# Patient Record
Sex: MALE | Race: WHITE | NOT HISPANIC OR LATINO | Employment: UNEMPLOYED | URBAN - METROPOLITAN AREA
[De-identification: names, ages, dates, MRNs, and addresses within clinical notes are randomized per-mention and may not be internally consistent; named-entity substitution may affect disease eponyms.]

---

## 2021-05-06 ENCOUNTER — AMBULATORY - HEALTHEAST (OUTPATIENT)
Dept: SURGERY | Facility: CLINIC | Age: 36
End: 2021-05-06

## 2021-05-06 ENCOUNTER — RECORDS - HEALTHEAST (OUTPATIENT)
Dept: ADMINISTRATIVE | Facility: OTHER | Age: 36
End: 2021-05-06

## 2021-05-06 DIAGNOSIS — Z11.59 ENCOUNTER FOR SCREENING FOR OTHER VIRAL DISEASES: ICD-10-CM

## 2021-05-13 ENCOUNTER — ANESTHESIA - HEALTHEAST (OUTPATIENT)
Dept: SURGERY | Facility: CLINIC | Age: 36
End: 2021-05-13

## 2021-05-18 ENCOUNTER — AMBULATORY - HEALTHEAST (OUTPATIENT)
Dept: LAB | Facility: CLINIC | Age: 36
End: 2021-05-18

## 2021-05-18 DIAGNOSIS — Z11.59 ENCOUNTER FOR SCREENING FOR OTHER VIRAL DISEASES: ICD-10-CM

## 2021-05-19 ENCOUNTER — COMMUNICATION - HEALTHEAST (OUTPATIENT)
Dept: LAB | Facility: CLINIC | Age: 36
End: 2021-05-19

## 2021-05-19 LAB
SARS-COV-2 PCR COMMENT: ABNORMAL
SARS-COV-2 RNA SPEC QL NAA+PROBE: POSITIVE
SARS-COV-2 VIRUS SPECIMEN SOURCE: ABNORMAL

## 2021-05-21 ENCOUNTER — SURGERY - HEALTHEAST (OUTPATIENT)
Dept: SURGERY | Facility: CLINIC | Age: 36
End: 2021-05-21

## 2021-05-30 ENCOUNTER — ANESTHESIA - HEALTHEAST (OUTPATIENT)
Dept: SURGERY | Facility: CLINIC | Age: 36
End: 2021-05-30

## 2021-06-04 ENCOUNTER — RECORDS - HEALTHEAST (OUTPATIENT)
Dept: ADMINISTRATIVE | Facility: OTHER | Age: 36
End: 2021-06-04

## 2021-06-04 ENCOUNTER — SURGERY - HEALTHEAST (OUTPATIENT)
Dept: SURGERY | Facility: CLINIC | Age: 36
End: 2021-06-04

## 2021-06-04 ASSESSMENT — MIFFLIN-ST. JEOR: SCORE: 2441.34

## 2021-06-15 ENCOUNTER — COMMUNICATION - HEALTHEAST (OUTPATIENT)
Dept: SURGERY | Facility: CLINIC | Age: 36
End: 2021-06-15

## 2021-06-16 PROBLEM — M54.12 CERVICAL RADICULOPATHY: Status: ACTIVE | Noted: 2021-06-05

## 2021-06-17 NOTE — TELEPHONE ENCOUNTER
"-Coronavirus (COVID-19) Notification    Caller Name (Patient, parent, daughter/son, grandparent, etc)  Mikey Palmer    Reason for call  Notify of Positive Coronavirus (COVID-19) lab results, assess symptoms,  review  [employer] recommendations    Lab Result    Lab test:  2019-nCoV rRt-PCR or SARS-CoV-2 PCR    Oropharyngeal AND/OR nasopharyngeal swabs is POSITIVE for 2019-nCoV RNA/SARS-COV-2 PCR (COVID-19 virus)    RN Recommendations/Instructions per [facility] Coronavirus COVID-19 recommendations    Brief introduction script  Introduce self and then review script:  \"I am calling on behalf of [employer].  We were notified that your Coronavirus test (COVID-19) for was POSITIVE for the virus.  I have some information to relay to you but first I wanted to mention that the [place] Dept of Health will be contacting you shortly [it's possible MD already called Patient] to talk to you more about how you are feeling and other people you have had contact with who might now also have the virus.  Also,  [employer] [place] is Partnering with the [facility] for Covid-19 research, you may be contacted directly by research staff.\"    Assessment (Inquire about Patient's current symptoms)   Assessment   Current Symptoms at time of phone call: (if no symptoms, document No symptoms] None   Symptom onset (if applicable) NA     If at time of call, Patients symptoms hare worsened, the Patient should contact 911 or have someone drive them to Emergency Dept promptly:      If Patient calling 911, inform 911 personal that you have tested positive for the Coronavirus (COVID-19).  Place mask on and await 911 to arrive.    If Emergency Dept, If possible, please have another adult drive you to the Emergency Dept but you need to wear mask when in contact with other people.      Monoclonal Antibody Administration    You may be eligible to receive a new treatment with a monoclonal antibody for preventing hospitalization in " "patients at high risk for complications from COVID-19.   This medication is still experimental and available on a limited basis; it is given through an IV and must be given at an infusion center. Please note that not all people who are eligible will receive the medication since it is in limited supply.     Are you interested in being considered for this medication?  No.  Does the patient fit the criteria: No    If patient qualifies based on above criteria:  \"You will be contacted if you are selected to receive this treatment in the next 1-2 business days.   This is time sensitive and if you are not selected in the next 1-2 business days, you will not receive the medication.  If you do not receive a call to schedule, you have not been selected.\"    Review information with Patient    Your result was positive. This means you have COVID-19 (coronavirus).  We have sent you a letter that reviews the information that I'll be reviewing with you now.    How can I protect others?    If you have symptoms: stay home and away from others (self-isolate) until:    You've had no fever--and no medicine that reduces fever--for 1 full day (24 hours). And      Your other symptoms have gotten better. For example, your cough or breathing has improved. And     At least 10 days have passed since your symptoms started. (If you ve been told by a doctor that you have a weak immune system, wait 20 days.)     If you don't have symptoms: Stay home and away from others (self-isolate) until at least 10 days have passed since your first positive COVID-19 test. (Date test collected).    During this time:    Stay in your own room, including for meals. Use your own bathroom if you can.    Stay away from others in your home. No hugging, kissing or shaking hands. No visitors.     Don't go to work, school or anywhere else.     Clean  high touch  surfaces often (doorknobs, counters, handles, etc.). Use a household cleaning spray or wipes. You'll find a " full list on the EPA website at www.epa.gov/pesticide-registration/list-n-disinfectants-use-against-sars-cov-2.     Cover your mouth and nose with a mask, tissue or other face covering to avoid spreading germs.    Wash your hands and face often with soap and water.    Make a list of people you have been in close contact with recently, even if either of you wore a face covering.   ; Start your list from 2 days before you became ill or had a positive test.  ; Include anyone that was within 6 feet of you for a cumulative total of 15 minutes or more in 24 hours. (Example: if you sat next to Collin for 5 minutes in the morning and 10 minutes in the afternoon, then you were in close contact for 15 minutes total that day. Collin would be added to your list.)    A public health worker will call or text you. It is important that you answer. They will ask you questions about possible exposures to COVID-19, such as people you have been in direct contact with and places you have visited.    Tell the people on your list that you have COVID-19; they should stay away from others for 14 days starting from the last time they were in contact with you (unless you are told something different from a public health worker).     Caregivers in these groups are at risk for severe illness due to COVID-19:  o People 65 years and older  o People who live in a nursing home or long-term care facility  o People with chronic disease (lung, heart, cancer, diabetes, kidney, liver, immunologic)  o People who have a weakened immune system, including those who:  - Are in cancer treatment  - Take medicine that weakens the immune system, such as corticosteroids  - Had a bone marrow or organ transplant  - Have an immune deficiency  - Have poorly controlled HIV or AIDS  - Are obese (body mass index of 40 or higher)  - Smoke regularly    Caregivers should wear gloves while washing dishes, handling laundry and cleaning bedrooms and bathrooms.    Wash and dry  laundry with special caution. Don't shake dirty laundry, and use the warmest water setting you can.    If you have a weakened immune system, ask your doctor about other actions you should take.    For more tips, go to www.cdc.gov/coronavirus/2019-ncov/downloads/10Things.pdf.    You should not go back to work until you meet the guidelines above for ending your home isolation. You don't need to be retested for COVID-19 before going back to work--studies show that you won't spread the virus if it's been at least 10 days since your symptoms started (or 20 days, if you have a weak immune system).    Employers: This document serves as formal notice of your employee's medical guidelines for going back to work. They must meet the above guidelines before going back to work in person.    How can I take care of myself?    1. Get lots of rest. Drink extra fluids (unless a doctor has told you not to).    2. Take Tylenol (acetaminophen) for fever or pain. If you have liver or kidney problems, ask your family doctor if it's okay to take Tylenol.     Take either:     650 mg (two 325 mg pills) every 4 to 6 hours, or     1,000 mg (two 500 mg pills) every 8 hours as needed.     Note: Don't take more than 3,000 mg in one day. Acetaminophen is found in many medicines (both prescribed and over-the-counter medicines). Read all labels to be sure you don't take too much.    For children, check the Tylenol bottle for the right dose (based on their age or weight).    3. If you have other health problems (like cancer, heart failure, an organ transplant or severe kidney disease): Call your specialty clinic if you don't feel better in the next 2 days.    4. Know when to call 911: Emergency warning signs include:    Trouble breathing or shortness of breath    Pain or pressure in the chest that doesn't go away    Feeling confused like you haven't felt before, or not being able to wake up    Bluish-colored lips or face    5. Sign up for [facility]  Loop. We know it's scary to hear that you have COVID-19. We want to track your symptoms to make sure you're okay over the next 2 weeks. Please look for an email from Jeromy Sheehan--this is a free, online program that we'll use to keep in touch. To sign up, follow the link in the email. Learn more at [email or web address].    Where can I get more information?    [facility]: [email or web address]    Coronavirus Basics: [email or web address]/[email or web address]/basics.html    What to Do If You're Sick: www.cdc.gov/coronavirus/2019-ncov/about/steps-when-sick.html    Ending Home Isolation: www.cdc.gov/coronavirus/2019-ncov/hcp/disposition-in-home-patients.html     Caring for Someone with COVID-19: www.cdc.gov/coronavirus/2019-ncov/if-you-are-sick/care-for-someone.html     [facility] clinical trials (COVID-19 research studies): [email or web address]     A Positive COVID-19 letter will be sent via [employer] or the Mail.  (Exception, no letters sent to Presurgerical/Preprocedure Patients)  Pre-procedure test result.  Patient advised to contact the providers office for instruction o rescheduling procedure.  Chitra/Mariah Olvera LPN

## 2021-06-25 NOTE — TELEPHONE ENCOUNTER
Patient called in to request lab letter for positive covid results for unemployment.  Patient was transferred to medical records to assist.

## 2021-06-26 NOTE — ANESTHESIA PREPROCEDURE EVALUATION
Anesthesia Evaluation      Patient summary reviewed   No history of anesthetic complications     Airway   Mallampati: II  Neck ROM: full   Pulmonary - negative ROS and normal exam    breath sounds clear to auscultation                         Cardiovascular - negative ROS and normal exam  Rhythm: regular  Rate: normal,         Neuro/Psych - negative ROS     Endo/Other - negative ROS   (+) obesity,      GI/Hepatic/Renal - negative ROS           Dental - normal exam                        Anesthesia Plan  Planned anesthetic: general endotracheal  Routine induction with propofol, narcotic, relaxant.  Routine anti-nausea prophylaxis.  ASA 2   Induction: intravenous   Anesthetic plan and risks discussed with: patient    Post-op plan: routine recovery

## 2021-06-26 NOTE — ANESTHESIA CARE TRANSFER NOTE
Last vitals:   Vitals:    06/04/21 1837   BP: 131/83   Pulse: (!) 101   Resp: 16   Temp: 36.2  C (97.1  F)   SpO2: 91%     Patient's level of consciousness is drowsy  Spontaneous respirations: yes  Maintains airway independently: yes  Dentition unchanged: yes  Oropharynx: oropharynx clear of all foreign objects    QCDR Measures:  ASA# 20 - Surgical Safety Checklist: WHO surgical safety checklist completed prior to induction    PQRS# 430 - Adult PONV Prevention: 4558F - Pt received => 2 anti-emetic agents (different classes) preop & intraop  ASA# 8 - Peds PONV Prevention: NA - Not pediatric patient, not GA or 2 or more risk factors NOT present  PQRS# 424 - Cynthia-op Temp Management: 4559F - At least one body temp DOCUMENTED => 35.5C or 95.9F within required timeframe  PQRS# 426 - PACU Transfer Protocol: - Transfer of care checklist used  ASA# 14 - Acute Post-op Pain: ASA14B - Patient did NOT experience pain >= 7 out of 10

## 2021-06-26 NOTE — ANESTHESIA POSTPROCEDURE EVALUATION
Patient: Mikey Palmer  Procedure(s):  CERVICAL 4-5, CERVICAL 5-6 ANTERIOR CERVICAL DISCECTOMY FUSION  Anesthesia type: general    Patient location: PACU  Last vitals:   Vitals Value Taken Time   /77 06/04/21 1920   Temp 36.2  C (97.1  F) 06/04/21 1837   Pulse 89 06/04/21 1932   Resp 8 06/04/21 1930   SpO2 94 % 06/04/21 1932   Vitals shown include unvalidated device data.  Post vital signs: stable  Level of consciousness: awake and responds to simple questions  Post-anesthesia pain: pain controlled  Post-anesthesia nausea and vomiting: no  Pulmonary: unassisted, return to baseline  Cardiovascular: stable and blood pressure at baseline  Hydration: adequate  Anesthetic events: no    QCDR Measures:  ASA# 11 - Cynthia-op Cardiac Arrest: ASA11B - Patient did NOT experience unanticipated cardiac arrest  ASA# 12 - Cynthia-op Mortality Rate: ASA12B - Patient did NOT die  ASA# 13 - PACU Re-Intubation Rate: ASA13B - Patient did NOT require a new airway mgmt  ASA# 10 - Composite Anes Safety: ASA10A - No serious adverse event    Additional Notes:

## 2021-07-06 VITALS — BODY MASS INDEX: 39.29 KG/M2 | WEIGHT: 314.31 LBS | HEIGHT: 75 IN

## 2021-12-13 ENCOUNTER — OFFICE VISIT (OUTPATIENT)
Dept: FAMILY MEDICINE | Facility: CLINIC | Age: 36
End: 2021-12-13
Payer: COMMERCIAL

## 2021-12-13 VITALS
OXYGEN SATURATION: 98 % | DIASTOLIC BLOOD PRESSURE: 95 MMHG | SYSTOLIC BLOOD PRESSURE: 158 MMHG | HEART RATE: 83 BPM | BODY MASS INDEX: 40 KG/M2 | WEIGHT: 315 LBS | RESPIRATION RATE: 18 BRPM | TEMPERATURE: 99.1 F

## 2021-12-13 DIAGNOSIS — Z76.89 ENCOUNTER FOR NEW MEDICATION PRESCRIPTION: ICD-10-CM

## 2021-12-13 DIAGNOSIS — Z20.2 POTENTIAL EXPOSURE TO STD: Primary | ICD-10-CM

## 2021-12-13 DIAGNOSIS — Z11.3 SCREEN FOR STD (SEXUALLY TRANSMITTED DISEASE): ICD-10-CM

## 2021-12-13 PROBLEM — E66.01 MORBID OBESITY (H): Status: ACTIVE | Noted: 2021-12-13

## 2021-12-13 PROCEDURE — 87491 CHLMYD TRACH DNA AMP PROBE: CPT | Performed by: FAMILY MEDICINE

## 2021-12-13 PROCEDURE — 99204 OFFICE O/P NEW MOD 45 MIN: CPT | Performed by: FAMILY MEDICINE

## 2021-12-13 PROCEDURE — 87340 HEPATITIS B SURFACE AG IA: CPT | Performed by: FAMILY MEDICINE

## 2021-12-13 PROCEDURE — 87389 HIV-1 AG W/HIV-1&-2 AB AG IA: CPT | Performed by: FAMILY MEDICINE

## 2021-12-13 PROCEDURE — 86803 HEPATITIS C AB TEST: CPT | Performed by: FAMILY MEDICINE

## 2021-12-13 PROCEDURE — 86780 TREPONEMA PALLIDUM: CPT | Performed by: FAMILY MEDICINE

## 2021-12-13 PROCEDURE — 36415 COLL VENOUS BLD VENIPUNCTURE: CPT | Performed by: FAMILY MEDICINE

## 2021-12-13 PROCEDURE — 87591 N.GONORRHOEAE DNA AMP PROB: CPT | Performed by: FAMILY MEDICINE

## 2021-12-13 RX ORDER — EMTRICITABINE AND TENOFOVIR DISOPROXIL FUMARATE 200; 300 MG/1; MG/1
1 TABLET, FILM COATED ORAL DAILY
Qty: 28 TABLET | Refills: 0 | Status: SHIPPED | OUTPATIENT
Start: 2021-12-13 | End: 2022-01-10

## 2021-12-13 RX ORDER — CYCLOBENZAPRINE HCL 10 MG
TABLET ORAL
COMMUNITY
Start: 2021-09-27

## 2021-12-14 LAB — HIV 1+2 AB+HIV1 P24 AG SERPL QL IA: NEGATIVE

## 2021-12-14 NOTE — PROGRESS NOTES
"  Patient presents with:  Consult: Requesting medications to reduce the chances of an STD - Pt states he was having sex last night and this morning and his partner \" took the condom off\"       Subjective     Mikey Palmer is a 35 year old male who presents to clinic today for the following health issues:    HPI     STD exposure    Onset/time of potential exposure was yesterday, 12/12/21  in am and today 12/13/21 -unprotected oral and vaginal intercourse  Course of illness-no active symptoms at present, requesting preexposure prophylaxis  Does not know partner's  status -HIV and STDS    Prefers both male and female partners, last male partner 1 year ago, patient completed testing for HIV and was negative     No recent female intercourse until 2 days ago, no urethral discharge, no testicular pain or swelling, no erythema/rash in genital area, no abdominal pain, no frequent urination/nocturia/dysuria, no history of prostatitis   Patient has not taken nPEP in the past  No history of liver disease/hepatitis or excessive etoh use       No past medical history on file.  Social History     Tobacco Use     Smoking status: Never Smoker     Smokeless tobacco: Never Used   Substance Use Topics     Alcohol use: Not Currently       Current Outpatient Medications   Medication Sig Dispense Refill     acetaminophen (TYLENOL) 325 MG tablet [ACETAMINOPHEN (TYLENOL) 325 MG TABLET] Take 3 tablets (975 mg total) by mouth every 8 (eight) hours.  0     emtricitabine-tenofovir (TRUVADA) 200-300 MG per tablet Take 1 tablet by mouth daily for 28 days 28 tablet 0     raltegravir (ISENTRESS) 400 MG tablet Take 1 tablet (400 mg) by mouth 2 times daily for 28 days 56 tablet 0     cyclobenzaprine (FLEXERIL) 10 MG tablet        hydrOXYzine pamoate (VISTARIL) 25 MG capsule [HYDROXYZINE PAMOATE (VISTARIL) 25 MG CAPSULE] Take 1 capsule (25 mg total) by mouth every 4 (four) hours as needed for itching, anxiety or other (muscle spasms). " (Patient not taking: Reported on 12/13/2021) 30 capsule 0     oxyCODONE (ROXICODONE) 5 MG immediate release tablet [OXYCODONE (ROXICODONE) 5 MG IMMEDIATE RELEASE TABLET] Take 1-2 tablets (5-10 mg total) by mouth every 4 (four) hours as needed. (Patient not taking: Reported on 12/13/2021) 20 tablet 0     senna-docusate (PERICOLACE) 8.6-50 mg tablet [SENNA-DOCUSATE (PERICOLACE) 8.6-50 MG TABLET] Take 1 tablet by mouth 2 (two) times a day as needed for constipation. (Patient not taking: Reported on 12/13/2021)  0     No Known Allergies          ROS are negative, except as otherwise noted HPI      Objective    BP (!) 158/95   Pulse 83   Temp 99.1  F (37.3  C) (Oral)   Resp 18   Wt 145.2 kg (320 lb)   SpO2 98%   BMI 40.00 kg/m    Body mass index is 40 kg/m .  Physical Exam   GENERAL: alert and no distress  NEURO: Normal strength and tone, mentation intact and speech normal  PSYCH: mentation appears normal, affect normal/       Diagnostic Test Results:  Labs reviewed in Epic  No results found for any visits on 12/13/21.        ASSESSMENT/PLAN:      ICD-10-CM    1. Potential exposure to STD  Z20.2 HIV Antigen Antibody Combo     Treponema Abs w Reflex to RPR and Titer     Hepatitis C antibody     Chlamydia trachomatis/Neisseria gonorrhoeae by PCR - Clinic Collect     Hepatitis B surface antigen     HIV Antigen Antibody Combo     Treponema Abs w Reflex to RPR and Titer     Hepatitis C antibody     Hepatitis B surface antigen     emtricitabine-tenofovir (TRUVADA) 200-300 MG per tablet     raltegravir (ISENTRESS) 400 MG tablet     Comprehensive metabolic panel     CBC with platelets and differential     Hepatitis B Surface Antibody     Hepatitis B core antibody   2. Encounter for new medication prescription  Z76.89 Comprehensive metabolic panel     CBC with platelets and differential     Hepatitis B Surface Antibody     Hepatitis B core antibody    patient starting PEP-post exposure prophylaxis antiretrovirals   3.  Screen for STD (sexually transmitted disease)  Z11.3 HIV Antigen Antibody Combo     Treponema Abs w Reflex to RPR and Titer     Hepatitis C antibody     Chlamydia trachomatis/Neisseria gonorrhoeae by PCR - Clinic Collect     Hepatitis B surface antigen     HIV Antigen Antibody Combo     Treponema Abs w Reflex to RPR and Titer     Hepatitis C antibody     Hepatitis B surface antigen     Patient prescribed nPEP based on history of potential exposure       Handout from up to date with outline of  further follow up and laboratory studies required after starting nPEP reviewed with patient and copy of  handout given to patient -he will return in am to for lab appointment for cbc and cmp, HB-surface antibody and HB-core antibody. He will schedule an appointment with a new PCP and have orders placed for continued follow up as outlined in handout.  Copy/paste of the handout from up to date can be found in patient instructions noted below.          Reviewed medication instructions and side effects. Follow up if experiences side effects.     I reviewed supportive care, otc meds to use if needed, expected course, and signs of concern.  Follow up as needed or if he does not improve within  1-2 days or if worsens in any way.  Reviewed red flag symptoms and is to go to the ER if experiences any of these.     The use of Dragon/[employer]tral[employer] dictation services may have been used to construct the content in this note; any grammatical or spelling errors are non-intentional. Please contact the author of this note directly if you are in need of any clarification.        On the day of the encounter, time spend on chart review, patient visit, review of testing, documentation and discussion with other providers was 45  minutes        Patient Instructions     Start the prep protocol first dose in am  Take medication for a total for a total of 28 days,     Follow up with your pcp for repeat testing and monitoring       Patient Education      Exposure to Body Fluids (Non-Healthcare Worker)   Two serious illnesses can be spread through exposure to body fluids:    HIV    Hepatitis (types B, C, and D)  Most people exposed one time to the body fluid of a person infected with HIV or hepatitis don't get the virus. But you should take exposure very seriously. Both HIV and hepatitis virus infection can lead to long-term (chronic) illness and death.  The risk of infection depends on the type of exposure. It also depends on whether the HIV or hepatitis is being treated in the infected person. The table below lists the risk for getting HIV after exposure. But your risk may below if the person with HIV has their infection well controlled (undetectable) for 6 months or more.  Type of exposure to + HIV source Risk   Needle stick  3 out of 1,000 exposures   Needle sharing with injecting a drug           7 out of 1,000 exposures   Anal, vaginal, or oral sex 1 or less per 1,000 sex acts, but for receptive anal intercourse it is 1 per 200 sex acts   Receptive anal sex 138 out of 10,000 exposures   If you have not been vaccinated against hepatitis B, the risk of becoming infected with hepatitis B or C after a single exposure is much higher than with HIV. For needle stick or sexual exposures, the risk is 6 to 30 out of 100 exposures for hepatitis B. The risk of becoming infected after similar exposure to someone with active hepatitis C is 1 to 10 out of 100 exposures.  If you are in a sexual relationship, discuss your exposure and its risks with your partner. Consider not having sex or using condoms until you know that the person who exposed you is negative, or your follow-up testing is done. Also try not to get pregnant during this time. Don't donate blood, tissue, or semen. If you are a woman who is breastfeeding, discuss the risks to your baby with your healthcare provider .  Testing  The first testing for HIV and hepatitis status will be done on you today. If the  HIV and hepatitis status of the person you were exposed to is not known, try to make sure to have that person tested. If that person is positive or unknown, and your results are negative, you will need to have more blood tests at a later time to find out if infection has occurred. It can take up to 3 months for blood tests to turn positive for hepatitis. If HIV infection has occurred, the test usually becomes positive by 3 months after exposure. But a positive result could rarely be delayed up to 4 to 6 months after exposure. So you may need repeat HIV testing in 6 and 12 weeks, and possibly again at 4 to 6 months after exposure. If tests are negative for hepatitis and HIV on final follow-up testing, you can assume that you were not infected as a result of this exposure.  Post-exposure prophylaxis (PEP)  To protect you from hepatitis B, treatment will depend on the status of the person who exposed you, and whether you have been previously vaccinated. If you have not been vaccinated, you can get the first dose of the vaccine series today.  There is no preventive treatment or vaccine for hepatitis C or D.  Based on how recently the exposure occurred, the type of  exposure, and the risk of HIV in the person who you were exposed to, you may need preventive treatment with antiviral medicine. Treatment consists of 3 oral medicines taken 1 to 2 times a day for 4 weeks. You should start the treatment as soon as possible after the exposure. That means within 24 to 72 hours when possible. Since treatment may be started before test results are known, it can be stopped if the test results of the person who exposed you are negative.  Facts you need to know before making a treatment decision    There is limited information about how well the medicines work that are used for post-exposure prevention (prophylaxis) in people without HIV infection. But the treatment does seem to lower the risk of getting HIV after exposure.    The  antiviral medicines are often safe to take in the short term. Serious side effects have rarely occurred.    Be sure you understand the risk of passing on the disease and the risks of treatment before making your decision. If you are not sure, ask for more information.    You may refuse or stop post-exposure medicines at any time.    When to seek medical advice  Call your healthcare provider right away if any of these occur:    Unexplained fever over 100.4 F (38.0 C), or as advised    Swollen lymph glands    Sore throat    Rash    Muscle or joint aching    Prolonged or recurring diarrhea, nausea, or vomiting    Frequent headaches    Dark urine or light colored stools    Jaundice (yellow color to skin or eyes)    Abdominal pain    Unusual and prolonged fatigue  [employer] last reviewed this educational content on 9/1/2019 2000-2021 [employer]. All rights reserved. This information is not intended as a substitute for professional medical care. Always follow your healthcare professional's instructions.    Addendum-verbally reviewed the following information with patient   Handout from [employer] reviewed with patient for further follow up and laboratory studies required after starting nPEP and handout given to patient -he will return in am to for lab appointment for cbc, cmp,  HepB surface antibody and Hep B core antibody. He will schedule an appointment with a new PCP for continued follow up  as outlined in handout     copy of handout that was given to patient-copy/paste into this document from up to date      Recommended laboratory evaluation for nonoccupational post-exposure prophylaxis (nPEP) of HIV infection*  Test Baseline 4 to 6 weeks after exposure 3 months after exposure 6 months after exposure   HIV serologic testing  E, S? E E E?   Complete blood count with differential  E         Serum liver enzymes (ALT, AST) E E       Blood urea nitrogen/creatinine E E       Sexually transmitted diseases  screen (gonorrhea, chlamydia, syphilis)  E, S E    E    Hepatitis B serology (HBsAg, anti-HBs, anti-HBc) E, S     E    Hepatitis C serology E, S**     E   Pregnancy test (for women of reproductive age) E E (if sexual exposure)       HIV viral load   S (only if HIV-infected)         HIV resistance testing   S (only if HIV-infected)         ALT: alanine aminotransferase; AST: aspartate aminotransferase; HBV: hepatitis B virus; HCV: hepatitis C virus; E: exposed patient; S: source; HBsAg: hepatitis B surface antigen.  * This table is designed to monitor asymptomatic patients receiving tenofovir disoproxil fumarate-emtricitabine as the nucleoside combination with a third agent (eg, an integrase inhibitor or a boosted protease inhibitor). Additional testing (eg, for pregnancy, sexually transmitted diseases, hepatitis) should be performed if clinically indicated.    A lab-based fourth-generation HIV antigen-antibody assay is the preferred test.  ? HIV serologic testing is indicated for sources of unknown serostatus.  ? Only if patient became acutely infected with HCV.    If zidovudine-lamivudine is used as the nucleoside combination, a complete blood count should be performed while the patient is receiving nonoccupational post-exposure prophylaxis (after approximately two weeks).     For patients with a sexual exposure. Refer to the topic review in UpToDate that discusses screening of sexually transmitted infections.    Syphilis testing should be performed four to six weeks and six months after a sexual exposure. Chlamydia and gonorrhea testing should be performed four to six weeks after a sexual exposure for those who were not treated empirically at baseline and for those who are symptomatic.      For patients who were not immune at baseline and were exposed to a source who is HBsAg-positive or whose HBsAg status is unknown. Refer to the UpToDate topic that discusses nonoccupational exposures to HBV for a discussion of  HBV post-exposure prophylaxis.  ** If the source tests positive for HCV, additional monitoring of the exposed patient may be required. Refer to the UpToDate topic review that discusses the diagnosis of acute HCV in adults.      For the exposed patient, virologic testing should only be performed if symptoms of acute HIV infection develop or if the patient is found to be HIV-infected on serologic testing.  Adapted from: [place] Centers for Disease Control and Prevention. Updated guidelines for antiretroviral postexposure prophylaxis after sexual, injection drug use, or other nonoccupational exposure to HIV--[place], 2016. http://stacks.cdc.gov/view/cdc/20038 (Accessed on April 20, 2016).  Graphic 62189 Version 5.0

## 2021-12-14 NOTE — PATIENT INSTRUCTIONS
Start the prep protocol first dose in am  Take medication for a total for a total of 28 days,     Follow up with you pcp for repeat testing and monitoring       Patient Education     Exposure to Body Fluids (Non-Healthcare Worker)   Two serious illnesses can be spread through exposure to body fluids:    HIV    Hepatitis (types B, C, and D)  Most people exposed one time to the body fluid of a person infected with HIV or hepatitis don't get the virus. But you should take exposure very seriously. Both HIV and hepatitis virus infection can lead to long-term (chronic) illness and death.  The risk of infection depends on the type of exposure. It also depends on whether the HIV or hepatitis is being treated in the infected person. The table below lists the risk for getting HIV after exposure. But your risk may below if the person with HIV has their infection well controlled (undetectable) for 6 months or more.  Type of exposure to + HIV source Risk   Needle stick  3 out of 1,000 exposures   Needle sharing with injecting a drug           7 out of 1,000 exposures   Anal, vaginal, or oral sex 1 or less per 1,000 sex acts, but for receptive anal intercourse it is 1 per 200 sex acts   Receptive anal sex 138 out of 10,000 exposures   If you have not been vaccinated against hepatitis B, the risk of becoming infected with hepatitis B or C after a single exposure is much higher than with HIV. For needle stick or sexual exposures, the risk is 6 to 30 out of 100 exposures for hepatitis B. The risk of becoming infected after similar exposure to someone with active hepatitis C is 1 to 10 out of 100 exposures.  If you are in a sexual relationship, discuss your exposure and its risks with your partner. Consider not having sex or using condoms until you know that the person who exposed you is negative, or your follow-up testing is done. Also try not to get pregnant during this time. Don't donate blood, tissue, or semen. If you are a woman  who is breastfeeding, discuss the risks to your baby with your healthcare provider .  Testing  The first testing for HIV and hepatitis status will be done on you today. If the HIV and hepatitis status of the person you were exposed to is not known, try to make sure to have that person tested. If that person is positive or unknown, and your results are negative, you will need to have more blood tests at a later time to find out if infection has occurred. It can take up to 3 months for blood tests to turn positive for hepatitis. If HIV infection has occurred, the test usually becomes positive by 3 months after exposure. But a positive result could rarely be delayed up to 4 to 6 months after exposure. So you may need repeat HIV testing in 6 and 12 weeks, and possibly again at 4 to 6 months after exposure. If tests are negative for hepatitis and HIV on final follow-up testing, you can assume that you were not infected as a result of this exposure.  Post-exposure prophylaxis (PEP)  To protect you from hepatitis B, treatment will depend on the status of the person who exposed you, and whether you have been previously vaccinated. If you have not been vaccinated, you can get the first dose of the vaccine series today.  There is no preventive treatment or vaccine for hepatitis C or D.  Based on how recently the exposure occurred, the type of  exposure, and the risk of HIV in the person who you were exposed to, you may need preventive treatment with antiviral medicine. Treatment consists of 3 oral medicines taken 1 to 2 times a day for 4 weeks. You should start the treatment as soon as possible after the exposure. That means within 24 to 72 hours when possible. Since treatment may be started before test results are known, it can be stopped if the test results of the person who exposed you are negative.  Facts you need to know before making a treatment decision    There is limited information about how well the medicines work  that are used for post-exposure prevention (prophylaxis) in people without HIV infection. But the treatment does seem to lower the risk of getting HIV after exposure.    The antiviral medicines are often safe to take in the short term. Serious side effects have rarely occurred.    Be sure you understand the risk of passing on the disease and the risks of treatment before making your decision. If you are not sure, ask for more information.    You may refuse or stop post-exposure medicines at any time.    When to seek medical advice  Call your healthcare provider right away if any of these occur:    Unexplained fever over 100.4 F (38.0 C), or as advised    Swollen lymph glands    Sore throat    Rash    Muscle or joint aching    Prolonged or recurring diarrhea, nausea, or vomiting    Frequent headaches    Dark urine or light colored stools    Jaundice (yellow color to skin or eyes)    Abdominal pain    Unusual and prolonged fatigue  [employer] last reviewed this educational content on 9/1/2019 2000-2021 [employer]. All rights reserved. This information is not intended as a substitute for professional medical care. Always follow your healthcare professional's instructions.    Handout from update reviewed with  patient for further follow up and laboratory studies required after starting nPEP and handout given to patient -he will return in am to for lab appointment for cbc and cmp and will schedule an appointment with a new PCP and have orders placed for continued follow up as outlined in handout    copy of handout noted below      Recommended laboratory evaluation for nonoccupational post-exposure prophylaxis (nPEP) of HIV infection*  Test Baseline 4 to 6 weeks after exposure 3 months after exposure 6 months after exposure   HIV serologic testing  E, S? E E E?   Complete blood count with differential  E         Serum liver enzymes (ALT, AST) E E       Blood urea nitrogen/creatinine E E       Sexually  transmitted diseases screen (gonorrhea, chlamydia, syphilis)  E, S E    E    Hepatitis B serology (HBsAg, anti-HBs, anti-HBc) E, S     E    Hepatitis C serology E, S**     E   Pregnancy test (for women of reproductive age) E E (if sexual exposure)       HIV viral load   S (only if HIV-infected)         HIV resistance testing   S (only if HIV-infected)         ALT: alanine aminotransferase; AST: aspartate aminotransferase; HBV: hepatitis B virus; HCV: hepatitis C virus; E: exposed patient; S: source; HBsAg: hepatitis B surface antigen.  * This table is designed to monitor asymptomatic patients receiving tenofovir disoproxil fumarate-emtricitabine as the nucleoside combination with a third agent (eg, an integrase inhibitor or a boosted protease inhibitor). Additional testing (eg, for pregnancy, sexually transmitted diseases, hepatitis) should be performed if clinically indicated.    A lab-based fourth-generation HIV antigen-antibody assay is the preferred test.  ? HIV serologic testing is indicated for sources of unknown serostatus.  ? Only if patient became acutely infected with HCV.    If zidovudine-lamivudine is used as the nucleoside combination, a complete blood count should be performed while the patient is receiving nonoccupational post-exposure prophylaxis (after approximately two weeks).     For patients with a sexual exposure. Refer to the topic review in UpToDate that discusses screening of sexually transmitted infections.    Syphilis testing should be performed four to six weeks and six months after a sexual exposure. Chlamydia and gonorrhea testing should be performed four to six weeks after a sexual exposure for those who were not treated empirically at baseline and for those who are symptomatic.      For patients who were not immune at baseline and were exposed to a source who is HBsAg-positive or whose HBsAg status is unknown. Refer to the UpToDate topic that discusses nonoccupational exposures to HBV  for a discussion of HBV post-exposure prophylaxis.  ** If the source tests positive for HCV, additional monitoring of the exposed patient may be required. Refer to the UpToDate topic review that discusses the diagnosis of acute HCV in adults.      For the exposed patient, virologic testing should only be performed if symptoms of acute HIV infection develop or if the patient is found to be HIV-infected on serologic testing.  Adapted from: [place] Centers for Disease Control and Prevention. Updated guidelines for antiretroviral postexposure prophylaxis after sexual, injection drug use, or other nonoccupational exposure to HIV--[place], 2016. http://stacks.cdc.gov/view/cdc/00358 (Accessed on April 20, 2016).  Graphic 94854 Version 5.0

## 2021-12-15 LAB
C TRACH DNA SPEC QL PROBE+SIG AMP: NEGATIVE
HBV SURFACE AG SERPL QL IA: NONREACTIVE
HCV AB SERPL QL IA: NEGATIVE
N GONORRHOEA DNA SPEC QL NAA+PROBE: NEGATIVE
T PALLIDUM AB SER QL: NONREACTIVE

## 2021-12-17 ENCOUNTER — LAB (OUTPATIENT)
Dept: LAB | Facility: CLINIC | Age: 36
End: 2021-12-17
Payer: COMMERCIAL

## 2021-12-17 DIAGNOSIS — Z76.89 ENCOUNTER FOR NEW MEDICATION PRESCRIPTION: ICD-10-CM

## 2021-12-17 DIAGNOSIS — Z20.2 POTENTIAL EXPOSURE TO STD: ICD-10-CM

## 2021-12-17 LAB
ALBUMIN SERPL-MCNC: 4 G/DL (ref 3.5–5)
ALP SERPL-CCNC: 91 U/L (ref 45–120)
ALT SERPL W P-5'-P-CCNC: 71 U/L (ref 0–45)
ANION GAP SERPL CALCULATED.3IONS-SCNC: 12 MMOL/L (ref 5–18)
AST SERPL W P-5'-P-CCNC: 41 U/L (ref 0–40)
BASOPHILS # BLD AUTO: 0.1 10E3/UL (ref 0–0.2)
BASOPHILS NFR BLD AUTO: 1 %
BILIRUB SERPL-MCNC: 0.6 MG/DL (ref 0–1)
BUN SERPL-MCNC: 10 MG/DL (ref 8–22)
CALCIUM SERPL-MCNC: 9.4 MG/DL (ref 8.5–10.5)
CHLORIDE BLD-SCNC: 102 MMOL/L (ref 98–107)
CO2 SERPL-SCNC: 26 MMOL/L (ref 22–31)
CREAT SERPL-MCNC: 0.86 MG/DL (ref 0.7–1.3)
EOSINOPHIL # BLD AUTO: 0.1 10E3/UL (ref 0–0.7)
EOSINOPHIL NFR BLD AUTO: 1 %
ERYTHROCYTE [DISTWIDTH] IN BLOOD BY AUTOMATED COUNT: 12.4 % (ref 10–15)
GFR SERPL CREATININE-BSD FRML MDRD: >90 ML/MIN/1.73M2
GLUCOSE BLD-MCNC: 102 MG/DL (ref 70–125)
HCT VFR BLD AUTO: 47.4 % (ref 40–53)
HGB BLD-MCNC: 16.5 G/DL (ref 13.3–17.7)
IMM GRANULOCYTES # BLD: 0.1 10E3/UL
IMM GRANULOCYTES NFR BLD: 1 %
LYMPHOCYTES # BLD AUTO: 2.2 10E3/UL (ref 0.8–5.3)
LYMPHOCYTES NFR BLD AUTO: 26 %
MCH RBC QN AUTO: 31 PG (ref 26.5–33)
MCHC RBC AUTO-ENTMCNC: 34.8 G/DL (ref 31.5–36.5)
MCV RBC AUTO: 89 FL (ref 78–100)
MONOCYTES # BLD AUTO: 0.6 10E3/UL (ref 0–1.3)
MONOCYTES NFR BLD AUTO: 7 %
NEUTROPHILS # BLD AUTO: 5.3 10E3/UL (ref 1.6–8.3)
NEUTROPHILS NFR BLD AUTO: 64 %
PLATELET # BLD AUTO: 245 10E3/UL (ref 150–450)
POTASSIUM BLD-SCNC: 4 MMOL/L (ref 3.5–5)
PROT SERPL-MCNC: 7.3 G/DL (ref 6–8)
RBC # BLD AUTO: 5.32 10E6/UL (ref 4.4–5.9)
SODIUM SERPL-SCNC: 140 MMOL/L (ref 136–145)
WBC # BLD AUTO: 8.3 10E3/UL (ref 4–11)

## 2021-12-17 PROCEDURE — 80053 COMPREHEN METABOLIC PANEL: CPT

## 2021-12-17 PROCEDURE — 86704 HEP B CORE ANTIBODY TOTAL: CPT

## 2021-12-17 PROCEDURE — 85025 COMPLETE CBC W/AUTO DIFF WBC: CPT

## 2021-12-17 PROCEDURE — 86706 HEP B SURFACE ANTIBODY: CPT

## 2021-12-17 PROCEDURE — 36415 COLL VENOUS BLD VENIPUNCTURE: CPT

## 2021-12-20 LAB
HBV CORE AB SERPL QL IA: NEGATIVE
HBV SURFACE AB SERPLBLD QL IA.RAPID: NEGATIVE

## 2021-12-28 ENCOUNTER — OFFICE VISIT (OUTPATIENT)
Dept: FAMILY MEDICINE | Facility: CLINIC | Age: 36
End: 2021-12-28
Payer: COMMERCIAL

## 2021-12-28 VITALS
TEMPERATURE: 98.3 F | WEIGHT: 315 LBS | HEART RATE: 82 BPM | DIASTOLIC BLOOD PRESSURE: 74 MMHG | BODY MASS INDEX: 41.04 KG/M2 | OXYGEN SATURATION: 96 % | SYSTOLIC BLOOD PRESSURE: 128 MMHG

## 2021-12-28 DIAGNOSIS — E66.01 MORBID OBESITY (H): Primary | ICD-10-CM

## 2021-12-28 DIAGNOSIS — Z23 NEED FOR COVID-19 VACCINE: ICD-10-CM

## 2021-12-28 DIAGNOSIS — R74.01 TRANSAMINITIS: ICD-10-CM

## 2021-12-28 LAB
ALBUMIN SERPL-MCNC: 4 G/DL (ref 3.5–5)
ALP SERPL-CCNC: 106 U/L (ref 45–120)
ALT SERPL W P-5'-P-CCNC: 63 U/L (ref 0–45)
ANION GAP SERPL CALCULATED.3IONS-SCNC: 10 MMOL/L (ref 5–18)
AST SERPL W P-5'-P-CCNC: 39 U/L (ref 0–40)
BILIRUB SERPL-MCNC: 0.6 MG/DL (ref 0–1)
BUN SERPL-MCNC: 14 MG/DL (ref 8–22)
CALCIUM SERPL-MCNC: 9.2 MG/DL (ref 8.5–10.5)
CHLORIDE BLD-SCNC: 103 MMOL/L (ref 98–107)
CHOLEST SERPL-MCNC: 147 MG/DL
CO2 SERPL-SCNC: 26 MMOL/L (ref 22–31)
CREAT SERPL-MCNC: 0.99 MG/DL (ref 0.7–1.3)
FASTING STATUS PATIENT QL REPORTED: ABNORMAL
GFR SERPL CREATININE-BSD FRML MDRD: >90 ML/MIN/1.73M2
GLUCOSE BLD-MCNC: 90 MG/DL (ref 70–125)
HDLC SERPL-MCNC: 25 MG/DL
LDLC SERPL CALC-MCNC: 71 MG/DL
POTASSIUM BLD-SCNC: 4.1 MMOL/L (ref 3.5–5)
PROT SERPL-MCNC: 7.4 G/DL (ref 6–8)
SODIUM SERPL-SCNC: 139 MMOL/L (ref 136–145)
TRIGL SERPL-MCNC: 257 MG/DL
TSH SERPL DL<=0.005 MIU/L-ACNC: 1.05 UIU/ML (ref 0.3–5)

## 2021-12-28 PROCEDURE — 99213 OFFICE O/P EST LOW 20 MIN: CPT | Performed by: STUDENT IN AN ORGANIZED HEALTH CARE EDUCATION/TRAINING PROGRAM

## 2021-12-28 PROCEDURE — 80053 COMPREHEN METABOLIC PANEL: CPT | Performed by: STUDENT IN AN ORGANIZED HEALTH CARE EDUCATION/TRAINING PROGRAM

## 2021-12-28 PROCEDURE — 80061 LIPID PANEL: CPT | Performed by: STUDENT IN AN ORGANIZED HEALTH CARE EDUCATION/TRAINING PROGRAM

## 2021-12-28 PROCEDURE — 36415 COLL VENOUS BLD VENIPUNCTURE: CPT | Performed by: STUDENT IN AN ORGANIZED HEALTH CARE EDUCATION/TRAINING PROGRAM

## 2021-12-28 PROCEDURE — 84443 ASSAY THYROID STIM HORMONE: CPT | Performed by: STUDENT IN AN ORGANIZED HEALTH CARE EDUCATION/TRAINING PROGRAM

## 2021-12-28 NOTE — PROGRESS NOTES
Assessment & Plan   Problem List Items Addressed This Visit        Digestive    Morbid obesity (H) - Primary    Relevant Orders    Lipid panel reflex to direct LDL Fasting    TSH with free T4 reflex    Comprehensive metabolic panel (BMP + Alb, Alk Phos, ALT, AST, Total. Bili, TP)      Other Visit Diagnoses     Transaminitis        Relevant Orders    Comprehensive metabolic panel (BMP + Alb, Alk Phos, ALT, AST, Total. Bili, TP)    Need for COVID-19 vaccine             Patient is here to follow-up on results of the STI testing and baseline blood work from his most recent  visit.  Reviewed STI testing-all negative.  CBC normal.  BMP normal.  CMP did show mild transaminitis.  Patient is morbidly obese, drinks about 2-3 beers Fridays and Saturdays with friends.  No smoking, vaping, marijuana use, drug use.  Denies any RUQ pain.  Suggested that we repeat blood work today to make sure it is downtrending/resolved.  Patient amenable.  Repeat blood work  + baseline screening for thyroid and cholesterol levels also ordered.    Patient is due for COVID-19 and flu vaccine-declined both as he is in a hurry to get the car back to his friend.    18 minutes spent on the date of the encounter doing chart review, history and exam, documentation and further activities per the note    Return if symptoms worsen or fail to improve.    Sofia Garcia DO  [facility]    Buzz Jensen is a 36 year old who presents for the following health issues: lab results       Review of Systems   As per HPI       Objective    /74 (BP Location: Right arm, Patient Position: Sitting, Cuff Size: Adult Regular)   Pulse 82   Temp 98.3  F (36.8  C) (Oral)   Wt 148.9 kg (328 lb 5 oz)   SpO2 96%   BMI 41.04 kg/m    Body mass index is 41.04 kg/m .  Physical Exam   GENERAL: healthy, alert and no distress  PSYCH: mentation appears normal, affect sony

## 2021-12-30 ENCOUNTER — TELEPHONE (OUTPATIENT)
Dept: FAMILY MEDICINE | Facility: CLINIC | Age: 36
End: 2021-12-30
Payer: COMMERCIAL

## 2021-12-30 DIAGNOSIS — R74.01 TRANSAMINITIS: Primary | ICD-10-CM

## 2021-12-30 NOTE — TELEPHONE ENCOUNTER
----- Message from Sofia Garcia DO sent at 12/30/2021  3:29 PM CST -----  Call patient and let him know     His liver enzymes are improved but not resolved at this time. Would like to image his liver. Order placed.     Patient's cholesterol is abnormal - would have her increase exercise levels to incorporate moderate levels exercise ~ 30 mins up to 4-5x/weeks to help improve his numbers.     Rest of the bloodwork looks good.

## 2022-01-12 VITALS — BODY MASS INDEX: 39.08 KG/M2 | WEIGHT: 314.31 LBS | HEIGHT: 75 IN
